# Patient Record
Sex: MALE | Race: BLACK OR AFRICAN AMERICAN | Employment: FULL TIME | URBAN - METROPOLITAN AREA
[De-identification: names, ages, dates, MRNs, and addresses within clinical notes are randomized per-mention and may not be internally consistent; named-entity substitution may affect disease eponyms.]

---

## 2020-11-12 ENCOUNTER — HOSPITAL ENCOUNTER (EMERGENCY)
Age: 25
Discharge: HOME | End: 2020-11-12
Attending: EMERGENCY MEDICINE
Payer: MEDICAID

## 2020-11-12 VITALS
DIASTOLIC BLOOD PRESSURE: 79 MMHG | BODY MASS INDEX: 28.88 KG/M2 | TEMPERATURE: 98.4 F | HEIGHT: 74 IN | SYSTOLIC BLOOD PRESSURE: 147 MMHG | OXYGEN SATURATION: 98 % | RESPIRATION RATE: 20 BRPM | WEIGHT: 225 LBS | HEART RATE: 58 BPM

## 2020-11-12 DIAGNOSIS — K59.00 CONSTIPATION, UNSPECIFIED CONSTIPATION TYPE: Primary | ICD-10-CM

## 2020-11-12 PROCEDURE — 99283 EMERGENCY DEPT VISIT LOW MDM: CPT

## 2020-11-12 PROCEDURE — 74011250637 HC RX REV CODE- 250/637: Performed by: EMERGENCY MEDICINE

## 2020-11-12 RX ORDER — MAGNESIUM CITRATE
296 SOLUTION, ORAL ORAL
Status: COMPLETED | OUTPATIENT
Start: 2020-11-12 | End: 2020-11-12

## 2020-11-12 RX ADMIN — ENEMA 1 ENEMA: 19; 7 ENEMA RECTAL at 20:27

## 2020-11-12 RX ADMIN — MAGNESIUM CITRATE 296 ML: 1.75 LIQUID ORAL at 21:16

## 2020-11-13 NOTE — ROUTINE PROCESS
Maame Rudolph is a 25 y.o. male that was discharged in stable. Pt was accompanied by self. Pt is driving. The patients diagnosis, condition and treatment were explained to  patient and aftercare instructions were given. The patient verbalized understanding. Patient armband removed and shredded.

## 2020-11-13 NOTE — DISCHARGE INSTRUCTIONS
Constipation: Care Instructions  Your Care Instructions     Constipation means that you have a hard time passing stools (bowel movements). People pass stools from 3 times a day to once every 3 days. What is normal for you may be different. Constipation may occur with pain in the rectum and cramping. The pain may get worse when you try to pass stools. Sometimes there are small amounts of bright red blood on toilet paper or the surface of stools. This is because of enlarged veins near the rectum (hemorrhoids). A few changes in your diet and lifestyle may help you avoid ongoing constipation. Your doctor may also prescribe medicine to help loosen your stool. Some medicines can cause constipation. These include pain medicines and antidepressants. Tell your doctor about all the medicines you take. Your doctor may want to make a medicine change to ease your symptoms. Follow-up care is a key part of your treatment and safety. Be sure to make and go to all appointments, and call your doctor if you are having problems. It's also a good idea to know your test results and keep a list of the medicines you take. How can you care for yourself at home? · Drink plenty of fluids, enough so that your urine is light yellow or clear like water. If you have kidney, heart, or liver disease and have to limit fluids, talk with your doctor before you increase the amount of fluids you drink. · Include high-fiber foods in your diet each day. These include fruits, vegetables, beans, and whole grains. · Get at least 30 minutes of exercise on most days of the week. Walking is a good choice. You also may want to do other activities, such as running, swimming, cycling, or playing tennis or team sports. · Take a fiber supplement, such as Citrucel or Metamucil, every day. Read and follow all instructions on the label. · Schedule time each day for a bowel movement. A daily routine may help.  Take your time having your bowel movement. · Support your feet with a small step stool when you sit on the toilet. This helps flex your hips and places your pelvis in a squatting position. · Your doctor may recommend an over-the-counter laxative to relieve your constipation. Examples are Milk of Magnesia and MiraLax. Read and follow all instructions on the label. Do not use laxatives on a long-term basis. When should you call for help? Call your doctor now or seek immediate medical care if:    · You have new or worse belly pain.     · You have new or worse nausea or vomiting.     · You have blood in your stools. Watch closely for changes in your health, and be sure to contact your doctor if:    · Your constipation is getting worse.     · You do not get better as expected. Where can you learn more? Go to http://www.cherry.com/  Enter P343 in the search box to learn more about \"Constipation: Care Instructions. \"  Current as of: June 26, 2019               Content Version: 12.6  © 2232-9479 [employer]. Care instructions adapted under license by [employer] (which disclaims liability or warranty for this information). If you have questions about a medical condition or this instruction, always ask your healthcare professional. [place] [address] any warranty or liability for your use of this information. Patient Education        Constipation: Care Instructions  Your Care Instructions     Constipation means that you have a hard time passing stools (bowel movements). People pass stools from 3 times a day to once every 3 days. What is normal for you may be different. Constipation may occur with pain in the rectum and cramping. The pain may get worse when you try to pass stools. Sometimes there are small amounts of bright red blood on toilet paper or the surface of stools. This is because of enlarged veins near the rectum (hemorrhoids).   A few changes in your diet and lifestyle may help you avoid ongoing constipation. Your doctor may also prescribe medicine to help loosen your stool. Some medicines can cause constipation. These include pain medicines and antidepressants. Tell your doctor about all the medicines you take. Your doctor may want to make a medicine change to ease your symptoms. Follow-up care is a key part of your treatment and safety. Be sure to make and go to all appointments, and call your doctor if you are having problems. It's also a good idea to know your test results and keep a list of the medicines you take. How can you care for yourself at home? · Drink plenty of fluids, enough so that your urine is light yellow or clear like water. If you have kidney, heart, or liver disease and have to limit fluids, talk with your doctor before you increase the amount of fluids you drink. · Include high-fiber foods in your diet each day. These include fruits, vegetables, beans, and whole grains. · Get at least 30 minutes of exercise on most days of the week. Walking is a good choice. You also may want to do other activities, such as running, swimming, cycling, or playing tennis or team sports. · Take a fiber supplement, such as Citrucel or Metamucil, every day. Read and follow all instructions on the label. · Schedule time each day for a bowel movement. A daily routine may help. Take your time having your bowel movement. · Support your feet with a small step stool when you sit on the toilet. This helps flex your hips and places your pelvis in a squatting position. · Your doctor may recommend an over-the-counter laxative to relieve your constipation. Examples are Milk of Magnesia and MiraLax. Read and follow all instructions on the label. Do not use laxatives on a long-term basis. When should you call for help?    Call your doctor now or seek immediate medical care if:    · You have new or worse belly pain.     · You have new or worse nausea or vomiting.     · You have blood in your stools. Watch closely for changes in your health, and be sure to contact your doctor if:    · Your constipation is getting worse.     · You do not get better as expected. Where can you learn more? Go to http://noman-yasmine.info/  Enter P343 in the search box to learn more about \"Constipation: Care Instructions. \"  Current as of: June 26, 2019               Content Version: 12.6  © 1430-0830 [employer]. Care instructions adapted under license by [employer] (which disclaims liability or warranty for this information). If you have questions about a medical condition or this instruction, always ask your healthcare professional. [address] any warranty or liability for your use of this information.

## 2020-11-13 NOTE — ED PROVIDER NOTES
HPI patient is a 24 yo male who presents to the ER x 2 days with constipation. He states he has been straining to have a BM and fecal bolus want come out. History reviewed. No pertinent past medical history. History reviewed. No pertinent surgical history. History reviewed. No pertinent family history. Social History     Socioeconomic History    Marital status: SINGLE     Spouse name: Not on file    Number of children: Not on file    Years of education: Not on file    Highest education level: Not on file   Occupational History    Not on file   Social Needs    Financial resource strain: Not on file    Food insecurity     Worry: Not on file     Inability: Not on file    Transportation needs     Medical: Not on file     Non-medical: Not on file   Tobacco Use    Smoking status: Never Smoker    Smokeless tobacco: Never Used   Substance and Sexual Activity    Alcohol use: Not Currently    Drug use: Yes     Types: Marijuana    Sexual activity: Not on file   Lifestyle    Physical activity     Days per week: Not on file     Minutes per session: Not on file    Stress: Not on file   Relationships    Social connections     Talks on phone: Not on file     Gets together: Not on file     Attends [employer] service: Not on file     Active member of club or organization: Not on file     Attends meetings of clubs or organizations: Not on file     Relationship status: Not on file    Intimate partner violence     Fear of current or ex partner: Not on file     Emotionally abused: Not on file     Physically abused: Not on file     Forced sexual activity: Not on file   Other Topics Concern    Not on file   Social History Narrative    Not on file         ALLERGIES: Patient has no known allergies. Review of Systems   Constitutional: Negative. HENT: Negative. Eyes: Negative. Respiratory: Negative. Cardiovascular: Negative. Gastrointestinal: Positive for constipation. Endocrine: Negative. Genitourinary: Negative. Musculoskeletal: Negative. Skin: Negative. Allergic/Immunologic: Negative. Neurological: Negative. Hematological: Negative. Psychiatric/Behavioral: Negative. All other systems reviewed and are negative. Vitals:    11/12/20 2015   BP: (!) 147/79   Pulse: (!) 58   Resp: 20   Temp: 98.4 °F (36.9 °C)   SpO2: 98%   Weight: 102.1 kg (225 lb)   Height: 6' 2\" (1.88 m)            Physical Exam  Vitals signs and nursing note reviewed. Constitutional:       General: He is not in acute distress. Appearance: He is well-developed. HENT:      Head: Normocephalic. Eyes:      Conjunctiva/sclera: Conjunctivae normal.      Pupils: Pupils are equal, round, and reactive to light. Neck:      Musculoskeletal: Normal range of motion and neck supple. Cardiovascular:      Rate and Rhythm: Normal rate and regular rhythm. Heart sounds: Normal heart sounds. No murmur. Pulmonary:      Effort: Pulmonary effort is normal. No respiratory distress. Breath sounds: Normal breath sounds. No wheezing or rales. Chest:      Chest wall: No tenderness. Abdominal:      General: Bowel sounds are normal. There is no distension. Palpations: Abdomen is soft. Tenderness: There is no abdominal tenderness. There is no rebound. Musculoskeletal: Normal range of motion. General: No tenderness. Skin:     General: Skin is warm and dry. Findings: No rash. Neurological:      Mental Status: He is alert and oriented to person, place, and time. Cranial Nerves: No cranial nerve deficit. Motor: No abnormal muscle tone. Coordination: Coordination normal.   Psychiatric:         Behavior: Behavior normal.         Thought Content:  Thought content normal.         Judgment: Judgment normal.          MDM  Number of Diagnoses or Management Options  Constipation, unspecified constipation type:   Risk of Complications, Morbidity, and/or Mortality  Presenting problems: low  Diagnostic procedures: low  Management options: low    Patient Progress  Patient progress: improved         Procedures:  Patient was given a fleet enema. He had good BM and his constipation resolved. Dx: acute constipation    Disp: D/C  Home. Increase fiber in diet. Mg citrate x 1. Return to ER prn. Dictation disclaimer:  Please note that this dictation was completed with [employer], the computer voice recognition software. Quite often unanticipated grammatical, syntax, homophones, and other interpretive errors are inadvertently transcribed by the computer software. Please disregard these errors. Please excuse any errors that have escaped final proofreading.

## 2021-10-29 ENCOUNTER — HOSPITAL ENCOUNTER (EMERGENCY)
Age: 26
Discharge: HOME | End: 2021-10-29
Attending: STUDENT IN AN ORGANIZED HEALTH CARE EDUCATION/TRAINING PROGRAM
Payer: MEDICAID

## 2021-10-29 VITALS
DIASTOLIC BLOOD PRESSURE: 80 MMHG | SYSTOLIC BLOOD PRESSURE: 128 MMHG | BODY MASS INDEX: 29.26 KG/M2 | TEMPERATURE: 98.5 F | WEIGHT: 228 LBS | HEART RATE: 63 BPM | OXYGEN SATURATION: 100 % | HEIGHT: 74 IN | RESPIRATION RATE: 19 BRPM

## 2021-10-29 DIAGNOSIS — G51.0 BELL'S PALSY: Primary | ICD-10-CM

## 2021-10-29 PROCEDURE — 99283 EMERGENCY DEPT VISIT LOW MDM: CPT

## 2021-10-29 RX ORDER — PREDNISONE 20 MG/1
60 TABLET ORAL DAILY
Qty: 15 TABLET | Refills: 0 | Status: SHIPPED | OUTPATIENT
Start: 2021-10-29 | End: 2021-11-03

## 2021-10-29 RX ORDER — DOLUTEGRAVIR SODIUM AND LAMIVUDINE 50; 300 MG/1; MG/1
TABLET, FILM COATED ORAL
COMMUNITY
Start: 2021-10-08

## 2021-10-29 NOTE — ED NOTES
Pt states he had numbness to left portion of face    Pt denies any medications    Pt states he also had a headache and took Excedrin

## 2021-10-29 NOTE — LETTER
NOTIFICATION RETURN TO WORK / SCHOOL    10/29/2021 12:01 PM    Mr. Renee Moe  [address] [address] [address] [facility] [address]      To Whom It May Concern:    Renee Moe is currently under the care of [id number] [place] [address] EMERGENCY DEPT. He will return to work/school on: 11/1/2021    Renee Moe may return to work/school with the following restrictions: None      If there are questions or concerns please have the patient contact our office.         Sincerely,            Kanu Mcallister RN

## 2021-10-29 NOTE — ED PROVIDER NOTES
EMERGENCY DEPARTMENT HISTORY AND PHYSICAL EXAM    Date: 10/29/2021  Patient Name: Irina Mendoza    History of Presenting Illness     Chief Complaint   Patient presents with    Numbness         History Provided By: Patient    Chief Complaint: left side face numbness/weakness  Duration: 2 days  Timing:  Woke up yesterday  Location: home  Quality: numbness, progression  Severity: mild  Modifying Factors: none  Associated Symptoms: none       Additional History (Context): Irina Mendoza is a 22 y.o. male with a history of HIV on Dovato, presents with left sided face weakness/numbness since waking up yesterday morning. No other weakness, no slurred speech, no headache or gait change. Pt has not had recent illness, fever, vaccine, rash, pain, or other URI/GI symptoms. Per pt, was checked a few months ago and his levels were undetectable for HIV so has not gone back. Does not report any symptoms at this time. PCP: None    Current Outpatient Medications   Medication Sig Dispense Refill    predniSONE (DELTASONE) 20 mg tablet Take 60 mg by mouth daily for 5 days. With Breakfast 15 Tablet 0    Dovato  mg tab          Past History     Past Medical History:  History reviewed. No pertinent past medical history. Past Surgical History:  History reviewed. No pertinent surgical history. Family History:  History reviewed. No pertinent family history. Social History:  Social History     Tobacco Use    Smoking status: Never Smoker    Smokeless tobacco: Never Used   Substance Use Topics    Alcohol use: Not Currently    Drug use: Yes     Types: Marijuana       Allergies:  No Known Allergies      Review of Systems   Review of Systems   Constitutional: Negative for activity change, chills, fatigue and fever. HENT: Negative for congestion, ear pain, mouth sores, postnasal drip, rhinorrhea, sinus pain, sore throat and trouble swallowing. Eyes: Negative for photophobia, pain and visual disturbance. Respiratory: Negative for cough, shortness of breath and wheezing. Cardiovascular: Negative for chest pain. Gastrointestinal: Negative for abdominal pain, nausea and vomiting. Genitourinary: Negative for dysuria. Musculoskeletal: Negative for arthralgias, back pain and gait problem. Skin: Negative for color change and pallor. Neurological: Positive for facial asymmetry and numbness. Negative for dizziness, tremors, syncope, weakness, light-headedness and headaches. Psychiatric/Behavioral: Negative for confusion. All Other Systems Negative  Physical Exam     Vitals:    10/29/21 1052   BP: 128/80   Pulse: 63   Resp: 19   Temp: 98.5 °F (36.9 °C)   SpO2: 100%   Weight: 103.4 kg (228 lb)   Height: 6' 2\" (1.88 m)     Physical Exam  Constitutional:       General: He is not in acute distress. Appearance: Normal appearance. He is normal weight. He is not ill-appearing or toxic-appearing. HENT:      Head: Normocephalic and atraumatic. Right Ear: Tympanic membrane, ear canal and external ear normal.      Left Ear: Tympanic membrane, ear canal and external ear normal.      Ears:      Comments: No lesions or rash noted in the ear/canal       Nose: Nose normal.      Comments: No lesions or rash noted inside the nose or around       Mouth/Throat:      Mouth: Mucous membranes are moist.      Pharynx: Oropharynx is clear. Eyes:      Extraocular Movements: Extraocular movements intact. Conjunctiva/sclera: Conjunctivae normal.      Pupils: Pupils are equal, round, and reactive to light. Cardiovascular:      Rate and Rhythm: Normal rate and regular rhythm. Pulses: Normal pulses. Heart sounds: Normal heart sounds. Pulmonary:      Effort: Pulmonary effort is normal.      Breath sounds: Normal breath sounds. Musculoskeletal:         General: Normal range of motion. Cervical back: Normal range of motion. Skin:     General: Skin is warm and dry.    Neurological:      Mental Status: He is alert and oriented to person, place, and time. Cranial Nerves: Cranial nerve deficit (weakness on left side of face with motor testing- weakness when closing eyes, unable to fully lift the left eyebrow, downward angled smile, cannot puff out cheeks. ) present. Sensory: Sensation is intact. No sensory deficit. Motor: Weakness (facial weakness only) present. Coordination: Coordination normal. Finger-Nose-Finger Test and Heel to [facility] Test normal. Rapid alternating movements normal.      Gait: Gait normal.      Deep Tendon Reflexes: Reflexes normal.      Reflex Scores:       Tricep reflexes are 2+ on the right side and 2+ on the left side. Bicep reflexes are 2+ on the right side and 2+ on the left side. Patellar reflexes are 1+ on the right side and 1+ on the left side. Achilles reflexes are 1+ on the right side and 1+ on the left side. Psychiatric:         Mood and Affect: Mood normal.         Behavior: Behavior normal.         Thought Content: Thought content normal.         Judgment: Judgment normal.           Diagnostic Study Results     Labs -   No results found for this or any previous visit (from the past 12 hour(s)). Radiologic Studies -   No orders to display     CT Results  (Last 48 hours)    None        CXR Results  (Last 48 hours)    None            Medical Decision Making   I am the first provider for this patient. I reviewed the vital signs, available nursing notes, past medical history, past surgical history, family history and social history. Vital Signs-Reviewed the patient's vital signs. Records Reviewed: Nursing Notes and Old Medical Records     Procedures: None   Procedures    Provider Notes (Medical Decision Making):   Johnson's Palsy, [place] Velasquez, viral illness, stroke. HIV pt, currently on therapy. Has fhx of Bell's Palsy; no other neuro findings. All branches of nerve involved.  Discussed case with Dr. Gustavo Kruger, agrees [facility] plan to treat as Bell's Palsy, follow up or return if any changes. MED RECONCILIATION:  No current facility-administered medications for this encounter. Current Outpatient Medications   Medication Sig    predniSONE (DELTASONE) 20 mg tablet Take 60 mg by mouth daily for 5 days. With Breakfast    Dovato  mg tab        Disposition:  Home     DISCHARGE NOTE:   Pt has been reexamined. Patient has no new complaints, changes, or physical findings. Care plan outlined and precautions discussed. Results of workup were reviewed with the patient. All medications were reviewed with the patient. All of pt's questions and concerns were addressed. Patient was instructed and agrees to follow up with PCP as well as to return to the ED upon further deterioration. Patient is ready to go home. Follow-up Information     Follow up With Specialties Details [address] [place] [id number] EMERGENCY DEPT Emergency Medicine  If symptoms worsen [id number]    Javi Gutierrez MD Neurology   [address] [place] [address]  [phone]            Discharge Medication List as of 10/29/2021 11:51 AM      START taking these medications    Details   predniSONE (DELTASONE) 20 mg tablet Take 60 mg by mouth daily for 5 days. With Breakfast, Normal, Disp-15 Tablet, R-0         CONTINUE these medications which have NOT CHANGED    Details   Dovato  mg tab Historical Med, LISA                 Diagnosis     Clinical Impression:   1. Bell's palsy          \"Please note that this dictation was completed with [employer], the computer voice recognition software. Quite often unanticipated grammatical, syntax, homophones, and other interpretive errors are inadvertently transcribed by the computer software. Please disregard these errors. Please excuse any errors that have escaped final proofreading. \"

## 2021-10-29 NOTE — DISCHARGE INSTRUCTIONS
Take the steroids as prescribed until gone  Return to the ER if you develop any worsening symptoms (weakness, fever, headache)  Can follow up with neurology if you are not improving after 2-3 weeks

## 2021-11-09 ENCOUNTER — OFFICE VISIT (OUTPATIENT)
Dept: NEUROLOGY | Age: 26
End: 2021-11-09
Payer: MEDICAID

## 2021-11-09 VITALS
RESPIRATION RATE: 18 BRPM | HEART RATE: 77 BPM | WEIGHT: 230 LBS | SYSTOLIC BLOOD PRESSURE: 118 MMHG | HEIGHT: 73 IN | OXYGEN SATURATION: 97 % | BODY MASS INDEX: 30.48 KG/M2 | DIASTOLIC BLOOD PRESSURE: 64 MMHG

## 2021-11-09 DIAGNOSIS — H02.402 PTOSIS OF LEFT EYELID: ICD-10-CM

## 2021-11-09 DIAGNOSIS — G51.0 LEFT-SIDED BELL'S PALSY: Primary | ICD-10-CM

## 2021-11-09 PROCEDURE — 99204 OFFICE O/P NEW MOD 45 MIN: CPT | Performed by: NURSE PRACTITIONER

## 2021-11-09 NOTE — PROGRESS NOTES
[facility]  [address], [address] [place], [address]  [address], [address]  Office:  [phone]  Fax: [phone]    Referring: [facility] ED    Chief Complaint   Patient presents with    Facial Droop     numbness to left side face       HPI:    This is a 25-year-old male who presents for new patient evaluation for hospital follow-up Bell's palsy. He presented to [facility] emergency room on October 29th with chief complaint of left facial weakness that is started the day previous. He said he was putting on Chapstick and had trouble manipulating his lips. Significant history of HIV and is on Dovato. He tells me he is followed by infectious disease at [facility]. Farheen Drivers he was seen last in April and numbers looked good. He follows up on the 15th. Continues to have some lower left facial numbness. Endorses symptoms have improved. While in the emergency room he received prescription for prednisone taper 60 mg for 5 days. He completed the full course. Denies any tearing or facial spasm. Denies trouble swallowing. Does report he is chewing on the right side. Can feel pronouncing some letters give him more difficulty but mostly speech is fluent. Per report denies recent illness, fever, vaccine, rash, respiratory or GI symptoms. He tells me he is followed by [facility] medicine located off [facility]. He said the last time he went was in January. Denies any lower extremity or upper extremity numbness or tingling. Denies any new onset weaknesses. Denies headaches, visual complaints, or additional concerns at this time.     Social History     Socioeconomic History    Marital status: SINGLE     Spouse name: Not on file    Number of children: Not on file    Years of education: Not on file    Highest education level: Not on file   Occupational History    Not on file   Tobacco Use    Smoking status: Never Smoker    Smokeless tobacco: Never Used Substance and Sexual Activity    Alcohol use: Not Currently    Drug use: Yes     Types: Marijuana    Sexual activity: Not on file   Other Topics Concern    Not on file   Social History Narrative    Not on file     Social Determinants of Health     Financial Resource Strain:     Difficulty of Paying Living Expenses: Not on file   Food Insecurity:     Worried About Running Out of Food in the Last Year: Not on file    Mohan of Food in the Last Year: Not on file   Transportation Needs:     Lack of Transportation (Medical): Not on file    Lack of Transportation (Non-Medical): Not on file   Physical Activity:     Days of Exercise per Week: Not on file    Minutes of Exercise per Session: Not on file   Stress:     Feeling of Stress : Not on file   Social Connections:     Frequency of Communication with Friends and Family: Not on file    Frequency of Social Gatherings with Friends and Family: Not on file    Attends Judaism Services: Not on file    Active Member of [address] [place] [address] or Organizations: Not on file    Attends Club or Organization Meetings: Not on file    Marital Status: Not on file   Intimate Partner Violence:     Fear of Current or Ex-Partner: Not on file    Emotionally Abused: Not on file    Physically Abused: Not on file    Sexually Abused: Not on file   Housing Stability:     Unable to Pay for Housing in the Last Year: Not on file    Number of [place] in the Last Year: Not on file    Unstable Housing in the Last Year: Not on file       History reviewed. No pertinent family history. Current Outpatient Medications   Medication Sig Dispense Refill    Dovato  mg tab          No past medical history on file. No past surgical history on file. No Known Allergies    There is no problem list on file for this patient.         Review of Systems:   Constitutional: no fever or chills  Skin denies rash or itching  HEENT:  Denies tinnitus, hearing loss, or visual changes  Respiratory: denies shortness of breath  Cardiovascular: denies chest pain, dyspnea on exertion  Gastrointestinal: does not report nausea or vomiting  Genitourinary: does not report dysuria or incontinence  Musculoskeletal: does not report joint pain or swelling  Endocrine: denies weight change  Hematology: denies easy bruising or bleeding   Neurological: as above in HPI      PHYSICAL EXAMINATION:      VITAL SIGNS:    Visit Vitals  /64   Pulse 77   Resp 18   Ht 6' 1\" (1.854 m)   Wt 104.3 kg (230 lb)   SpO2 97%   BMI 30.34 kg/m²       GENERAL: Well developed, well nourished, in no apparent distress. HEART: RR, no murmurs heard, no carotid bruits  LUNGS:                      CTAB  EXTREMITIES: No clubbing, cyanosis, or edema is identified. Pulses 2+    and symmetrical.  HEAD:   Normocephalic, atraumatic. NEUROLOGIC EXAMINATION    MENTAL STATUS: Awake, alert, and oriented x 4. Attention and STM are grossly normal. There is no aphasia. Fund of knowledge is adequate. Mood and affect are appropriate  CRANIAL NERVES: Visual fields are full to confrontation. No fundus anomalies observed. Pupils are reactive to light and accommodation. Extraocular movements are intact and there is no nystagmus. Facial sensation is decreased left lower quadrant  Face is asymmetrical with left-sided facial weakness. Positive left ptosis. Asymmetric smile. Hearing is grossly intact. SCM/TPZ 5/5  Palate rises symmetrically. Tongue is in the midline. MOTOR:   Normal tone, bulk, and strength, 5/5 muscle strength throughout. No cogwheel rigidity or clonus present. CEREBELLAR: Finger to nose was normal.   No tremors or dysmetria    SENSORY:  Normal PP, vibration, propioception.  Romberg negative    DTR's:   +2 throughout, toes downgoing     GAIT:   Normal gait, able to tandem walk    Impression/Plan  Art Ingrid is a 22 y.o. male whose history and physical are consistent with left-sided Bell's palsy with significant history of HIV. Patient was seen at [facility] emergency room on [phone] for chief complaint of left facial weakness. Diagnosed with Bell's palsy and treated with prednisone 60 mg x 5 days. He has since completed course. Endorses some minimal improvement in symptoms. Still having namely left lower facial weakness. Asymmetrical smile. Denies trouble swallowing. Does endorse chewing on the right side. Denies visual disturbances, lacrimation, conjunctival injection, or facial spasm. On examination today he does have left lid weakness and will place referral to ophthalmology for evaluation of such. We discussed conservative measures including lubricating eyedrops and potentially taping of the left eye when sleeping. Significant history of HIV and is followed by infectious disease with upcoming appointment on the 15th. Encouraged him to discuss Bell's palsy at their follow-up. Does endorse his labs were within normal limits. We discussed there is questionable benefit with physical therapy but he is open to this so will place referral and attempt to achieve improvement in left facial weakness. He is to monitor symptoms closely and follow-up with me in 8 weeks or sooner as need be. All questions addressed and patient is agreeable with plan of care. Diagnoses and all orders for this visit:    1. Left-sided Bell's palsy  -     REFERRAL TO PHYSICAL THERAPY  -     REFERRAL TO OPHTHALMOLOGY    2. Ptosis of left eyelid  -     REFERRAL TO OPHTHALMOLOGY      I spent 45 minutes with the patient in face-to-face consultation, with 37 minutes spent in counseling and coordination of care as described above. Signed By: Eben Zapien NP      PLEASE NOTE:   Portions of this document may have been produced using voice recognition software. Unrecognized errors in transcription may be present.

## 2021-11-09 NOTE — PROGRESS NOTES
Anni Carlson presents today for   Chief Complaint   Patient presents with    Facial Droop     numbness to left side face       Is someone accompanying this pt? Mom    Is the patient using any DME equipment during [address]? no    Depression Screening:  No flowsheet data found. Learning Assessment:  No flowsheet data found. Abuse Screening:  No flowsheet data found. Fall Risk  No flowsheet data found. Coordination of Care:  1. Have you been to the ER, urgent care clinic since your last visit? Hospitalized since your last visit? Yes, HV    2. Have you seen or consulted any other health care providers outside of the [address] [place] [address] since your last visit? Include any pap smears or colon screening.  no